# Patient Record
Sex: FEMALE | Race: WHITE
[De-identification: names, ages, dates, MRNs, and addresses within clinical notes are randomized per-mention and may not be internally consistent; named-entity substitution may affect disease eponyms.]

---

## 2018-05-31 ENCOUNTER — HOSPITAL ENCOUNTER (EMERGENCY)
Dept: HOSPITAL 62 - ER | Age: 53
Discharge: HOME | End: 2018-05-31
Payer: MEDICARE

## 2018-05-31 VITALS — DIASTOLIC BLOOD PRESSURE: 82 MMHG | SYSTOLIC BLOOD PRESSURE: 142 MMHG

## 2018-05-31 DIAGNOSIS — Z87.891: ICD-10-CM

## 2018-05-31 DIAGNOSIS — J45.909: ICD-10-CM

## 2018-05-31 DIAGNOSIS — M79.89: Primary | ICD-10-CM

## 2018-05-31 DIAGNOSIS — M25.471: ICD-10-CM

## 2018-05-31 DIAGNOSIS — M25.472: ICD-10-CM

## 2018-05-31 LAB
ABSOLUTE LYMPHOCYTES# (MANUAL): 1.7 10^3/UL (ref 0.5–4.7)
ABSOLUTE MONOCYTES # (MANUAL): 0.4 10^3/UL (ref 0.1–1.4)
ABSOLUTE NEUTROPHILS# (MANUAL): 5.1 10^3/UL (ref 1.7–8.2)
ADD MANUAL DIFF: YES
ALBUMIN SERPL-MCNC: 3.8 G/DL (ref 3.5–5)
ALP SERPL-CCNC: 72 U/L (ref 38–126)
ALT SERPL-CCNC: 29 U/L (ref 9–52)
ANION GAP SERPL CALC-SCNC: 9 MMOL/L (ref 5–19)
ANISOCYTOSIS BLD QL SMEAR: SLIGHT
APPEARANCE UR: CLEAR
APTT PPP: (no result) S
AST SERPL-CCNC: 23 U/L (ref 14–36)
BASOPHILS NFR BLD MANUAL: 1 % (ref 0–2)
BILIRUB DIRECT SERPL-MCNC: 0.2 MG/DL (ref 0–0.4)
BILIRUB SERPL-MCNC: 0.2 MG/DL (ref 0.2–1.3)
BILIRUB UR QL STRIP: NEGATIVE
BUN SERPL-MCNC: 11 MG/DL (ref 7–20)
CALCIUM: 10.2 MG/DL (ref 8.4–10.2)
CHLORIDE SERPL-SCNC: 103 MMOL/L (ref 98–107)
CO2 SERPL-SCNC: 29 MMOL/L (ref 22–30)
DACRYOCYTES BLD QL SMEAR: SLIGHT
EOSINOPHIL NFR BLD MANUAL: 2 % (ref 0–6)
ERYTHROCYTE [DISTWIDTH] IN BLOOD BY AUTOMATED COUNT: 15.7 % (ref 11.5–14)
GLUCOSE SERPL-MCNC: 95 MG/DL (ref 75–110)
GLUCOSE UR STRIP-MCNC: NEGATIVE MG/DL
HCT VFR BLD CALC: 34.4 % (ref 36–47)
HGB BLD-MCNC: 11.9 G/DL (ref 12–15.5)
KETONES UR STRIP-MCNC: NEGATIVE MG/DL
MCH RBC QN AUTO: 32.7 PG (ref 27–33.4)
MCHC RBC AUTO-ENTMCNC: 34.5 G/DL (ref 32–36)
MCV RBC AUTO: 95 FL (ref 80–97)
MONOCYTES % (MANUAL): 5 % (ref 3–13)
NEUTS BAND NFR BLD MANUAL: 1 % (ref 3–5)
NITRITE UR QL STRIP: NEGATIVE
OVALOCYTES BLD QL SMEAR: SLIGHT
PH UR STRIP: 5 [PH] (ref 5–9)
PLATELET # BLD: 247 10^3/UL (ref 150–450)
PLATELET COMMENT: ADEQUATE
POTASSIUM SERPL-SCNC: 4.5 MMOL/L (ref 3.6–5)
PROT SERPL-MCNC: 6.5 G/DL (ref 6.3–8.2)
PROT UR STRIP-MCNC: NEGATIVE MG/DL
RBC # BLD AUTO: 3.63 10^6/UL (ref 3.72–5.28)
SEGMENTED NEUTROPHILS % (MAN): 68 % (ref 42–78)
SODIUM SERPL-SCNC: 140.9 MMOL/L (ref 137–145)
SP GR UR STRIP: 1
TOTAL CELLS COUNTED BLD: 100
UROBILINOGEN UR-MCNC: NEGATIVE MG/DL (ref ?–2)
VARIANT LYMPHS NFR BLD MANUAL: 23 % (ref 13–45)
WBC # BLD AUTO: 7.4 10^3/UL (ref 4–10.5)

## 2018-05-31 PROCEDURE — 85025 COMPLETE CBC W/AUTO DIFF WBC: CPT

## 2018-05-31 PROCEDURE — 71046 X-RAY EXAM CHEST 2 VIEWS: CPT

## 2018-05-31 PROCEDURE — 99283 EMERGENCY DEPT VISIT LOW MDM: CPT

## 2018-05-31 PROCEDURE — 80053 COMPREHEN METABOLIC PANEL: CPT

## 2018-05-31 PROCEDURE — 36415 COLL VENOUS BLD VENIPUNCTURE: CPT

## 2018-05-31 PROCEDURE — 81001 URINALYSIS AUTO W/SCOPE: CPT

## 2018-05-31 NOTE — RADIOLOGY REPORT (SQ)
EXAM DESCRIPTION:  CHEST 2 VIEWS



COMPLETED DATE/TIME:  5/31/2018 8:53 pm



REASON FOR STUDY:  Swelling to hands and feet



COMPARISON:  None.



EXAM PARAMETERS:  NUMBER OF VIEWS: two views

TECHNIQUE: Digital Frontal and Lateral radiographic views of the chest acquired.

RADIATION DOSE: NA

LIMITATIONS: none



FINDINGS:  LUNGS AND PLEURA: No opacities, masses or pneumothorax. No pleural effusion.

MEDIASTINUM AND HILAR STRUCTURES: No masses or contour abnormalities.

HEART AND VASCULAR STRUCTURES: Heart normal size.  No evidence for failure.

BONES: No acute findings.

HARDWARE: None in the chest.

OTHER: No other significant finding.



IMPRESSION:  NO ACUTE RADIOGRAPHIC FINDING IN THE CHEST.



TECHNICAL DOCUMENTATION:  JOB ID:  [id number]

[id number]

 2011 [employer]- All Rights Reserved



Reading location - IP/workstation name: [email or web address]

## 2018-05-31 NOTE — ER DOCUMENT REPORT
ED General





- General


Chief Complaint: Swelling of Lower Extremity


Stated Complaint: LEG PAIN


Time Seen by Provider: 05/31/18 20:18


Mode of Arrival: Ambulatory


Information source: Patient


Notes: 





52-year-old female presents to ED for complaint of bilateral hands and feet 

swelling over the last several days.  She states she has had swelling to her 

hands and feet before but never this bad.  She states she does not have a 

primary doctor and does not go to a primary doctor.  She has had previous 

surgeries to her right foot and a total replacement of the left hip.  She 

states she has had a history of an appendectomy gallbladder tonsils and 

hemorrhoidectomy.  She states she does not smoke she has not been on any long 

drives she is alert and oriented.


TRAVEL OUTSIDE OF THE [place] IN LAST 30 DAYS: No





- HPI


Onset: Other - Several days


Onset/Duration: Gradual


Quality of pain: Pressure


Severity: Moderate


Pain Level: 2


Associated symptoms: Other - Swelling to hands and feet


Exacerbated by: Standing, Walking


Relieved by: Denies


Similar symptoms previously: Yes


Recently seen / treated by doctor: No





- Related Data


Allergies/Adverse Reactions: 


 





No Known Allergies Allergy (Unverified 09/27/17 17:55)


 











Past Medical History





- General


Information source: Patient





- Social History


Smoking Status: Former Smoker


Cigarette use (# per day): No


Chew tobacco use (# tins/day): No


Smoking Education Provided: No


Frequency of alcohol use: Rare


Drug Abuse: None


Lives with: Family


Family History: Reviewed & Not Pertinent


Patient has suicidal ideation: No


Patient has homicidal ideation: No


Pulmonary Medical History: Reports: Hx Asthma, Hx Bronchitis


EENT Medical History: Reports: None


Neurological Medical History: Reports: None


Endocrine Medical History: Reports: None


Renal/ Medical History: Reports: Hx Ovarian Cysts


Malignancy Medical History: Reports: None


GI Medical History: Reports: Other - Ventral hernia


Musculoskeltal Medical History: Reports Hx Arthritis, Reports Hx 

Musculoskeletal Deformity, Reports Hx Musculoskeletal Trauma


Skin Medical History: Reports None


Psychiatric Medical History: Reports: None


Traumatic Medical History: Reports: None


Infectious Medical History: Reports: None


Past Surgical History: Reports: Hx Appendectomy, Hx Cholecystectomy, Hx 

Orthopedic Surgery - Left total hip right foot surgery 6, Hx Tonsillectomy, Hx 

Tubal Ligation, Other - Hemorrhoidectomy





- Immunizations


Hx Diphtheria, Pertussis, Tetanus Vaccination:  - unknown





Review of Systems





- Review of Systems


Constitutional: No symptoms reported


EENT: No symptoms reported


Cardiovascular: No symptoms reported


Respiratory: No symptoms reported


Gastrointestinal: No symptoms reported


Genitourinary: No symptoms reported


Female Genitourinary: No symptoms reported


Musculoskeletal: Other - Swelling to hands and feet lower arms and lower legs


Skin: No symptoms reported


Hematologic/Lymphatic: No symptoms reported


Neurological/Psychological: No symptoms reported


-: Yes All other systems reviewed and negative





Physical Exam





- Vital signs


Vitals: 


 











Temp Pulse Resp BP Pulse Ox


 


 98.8 F   71   18   149/89 H  98 


 


 05/31/18 19:29  05/31/18 19:29  05/31/18 19:29  05/31/18 19:29  05/31/18 19:29











Interpretation: Normal





- General


General appearance: Appears well, Alert





- HEENT


Head: Normocephalic, Atraumatic


Eyes: Normal


Pupils: PERRL





- Respiratory


Respiratory status: No respiratory distress


Chest status: Nontender


Breath sounds: Normal


Chest palpation: Normal





- Cardiovascular


Rhythm: Regular


Heart sounds: Normal auscultation


Murmur: No





- Abdominal


Inspection: Normal


Distension: No distension


Bowel sounds: Normal


Tenderness: Nontender


Organomegaly: No organomegaly





- Back


Back: Normal, Nontender





- Extremities


General upper extremity: Nontender, Normal color, Normal ROM, Normal temperature


General lower extremity: Normal color, Normal ROM, Normal temperature, Normal 

weight bearing.  No: Marilyn's sign


Wrist: Other - Mild edema


Hand: No evidence of human bite, No evidence of FB, Swelling.  No: Abrasion, 

Deformity, Dislocation, Ecchymosis, Instability, Laceration, Tendon deficit


Calf: Tender


Ankle: Tender, Edema


Foot: Tender, Edema





- Neurological


Neuro grossly intact: Yes


Cognition: Normal


Orientation: AAOx4


Fei Coma Scale Eye Opening: Spontaneous


O'Brien Coma Scale Verbal: Oriented


O'Brien Coma Scale Motor: Obeys Commands


O'Brien Coma Scale Total: 15


Speech: Normal


Motor strength normal: LUE, RUE, LLE, RLE


Sensory: Normal





- Psychological


Associated symptoms: Normal affect, Normal mood





- Skin


Skin Temperature: Warm


Skin Moisture: Dry


Skin Color: Normal





Course





- Re-evaluation


Re-evalutation: 





06/01/18 01:32


Labs were discussed with patient before discharge.  She was given a written 

report of her labs and instructed to follow-up with her primary doctor.  

Patient states she has had similar symptoms in the past just not as bad.





- Vital Signs


Vital signs: 


 











Temp Pulse Resp BP Pulse Ox


 


 98.7 F   65   20   142/82 H  99 


 


 05/31/18 22:01  05/31/18 22:01  05/31/18 22:01  05/31/18 22:01  05/31/18 22:01














- Laboratory


Result Diagrams: 


 05/31/18 20:40





 05/31/18 20:40


Laboratory results interpreted by me: 


 











  05/31/18





  20:40


 


RBC  3.63 L


 


Hgb  11.9 L


 


Hct  34.4 L


 


RDW  15.7 H


 


Band Neutrophils %  1 L














Discharge





- Discharge


Clinical Impression: 


 Bilateral swelling of feet and ankles, Swelling of both hands





Condition: Stable


Disposition: HOME, SELF-CARE


Instructions:  Family Physicians / Practices


Additional Instructions: 


You have presented today for edema or swelling to your hands and feet.  You 

state you have had this before but not this bad.





Your chemistries have all been evaluated and there is no acute changes to your 

chemistries.








Chest x-ray is normal at this time.





You will need to follow-up with a primary local doctor.  I would like for you 

to call someone in the morning and schedule a follow-up appointment for this 

visit.





Edema, Peripheral





     You have swelling in your legs. This is called peripheral edema. It can be 

caused by "leaky capillaries," inflammation, disease of the leg veins, or 

excess salt and water in your body. Edema may be a sign of heart, kidney, or 

liver disease. A medical evaluation can determine if there is a serious 

underlying cause for your edema.


     Avoid prolonged standing. If you must sit for a long time, occasionally 

get up and walk around or elevate your legs. Support stockings can be helpful 

in limiting swelling. Often diuretic or water pills are used to remove excess 

salt and water from your body.


     Call the doctor or return if you develop increased swelling, pain, or 

redness, shortness of breath, chest pain, or any other significant change.





Acetaminophen





     Acetaminophen may be taken for pain relief or fever control. It's much 

safer than aspirin, offering a wider range of "safe" dosages.  It is safe 

during pregnancy.  Some brand names are Tylenol, Panadol, Datril, Anacin 3, 

Tempra, and Liquiprin. Acetaminophen can be repeated every four hours.  The 

following are maximum recommended dosages:





WEIGHT         Dose             Drops                  Elixir        Chewable(

80mg)


(LBS.)                            drprs=droppers    tsp=teaspoon


6                 40 mg            .4 ml (1/2)


6-11            80 mg            .8 ml (full)            1/2   tsp           1 

      tab


12-16         120 mg           1 1/2 drprs            3/4   tsp           1 1/2

  tabs


17-23         160 mg             2  drprs              1      tsp           2  

     tabs


24-30         240 mg             3  drprs              1 1/2 tsp           3   

    tabs


30-35         320 mg                                     2       tsp           

4       tabs


36-41         360 mg                                     2 1/4 tsp           4 1

/2  tabs


42-47         400 mg                                     2 1/2 tsp           5 

      tabs


48-53         480 mg                                     3       tsp          6

       tabs


54-59         520 mg                                     3  1/4 tsp          6 1

/2 tabs


60-64         560 mg                                     3  1/2 tsp          7 

     tabs 


65-70         600 mg                                     3  3/4 tsp          7 1

/2 tabs


71-76         640 mg                                     4       tsp           

8      tabs


77-82         720 mg                                     4 1/2  tsp           9

      tabs


83-88         800 mg                                     5       tsp           

10     tabs





>89 pounds or adults          650 mg to 900 mg 





Acetaminophen can be repeated every four hours. Maximum daily dose not to 

exceed 4000 mg.





   These maximum recommended dosages are slightly higher than the dosages 

written on the product container, but these dosages are very safe and well 

below the toxic dosage for acetaminophen.





FOLLOW-UP CARE:


If you have been referred to a physician for follow-up care, call the physician

s office for an appointment as you were instructed or within the next two days.

  If you experience worsening or a significant change in your symptoms, notify 

the physician immediately or return to the Emergency Department at any time for 

re-evaluation.








Forms:  Elevated Blood Pressure